# Patient Record
Sex: FEMALE | Race: WHITE | NOT HISPANIC OR LATINO | URBAN - METROPOLITAN AREA
[De-identification: names, ages, dates, MRNs, and addresses within clinical notes are randomized per-mention and may not be internally consistent; named-entity substitution may affect disease eponyms.]

---

## 2017-10-15 ENCOUNTER — EMERGENCY (EMERGENCY)
Facility: HOSPITAL | Age: 67
LOS: 1 days | Discharge: HOME | End: 2017-10-15
Attending: EMERGENCY MEDICINE | Admitting: EMERGENCY MEDICINE
Payer: SELF-PAY

## 2017-10-15 VITALS
DIASTOLIC BLOOD PRESSURE: 100 MMHG | SYSTOLIC BLOOD PRESSURE: 160 MMHG | HEART RATE: 88 BPM | TEMPERATURE: 98 F | RESPIRATION RATE: 18 BRPM

## 2017-10-15 LAB
ALBUMIN SERPL ELPH-MCNC: 4.4 G/DL — SIGNIFICANT CHANGE UP (ref 3.3–5)
ALP SERPL-CCNC: 50 U/L — SIGNIFICANT CHANGE UP (ref 40–120)
ALT FLD-CCNC: 13 U/L — SIGNIFICANT CHANGE UP (ref 4–33)
AST SERPL-CCNC: 11 U/L — SIGNIFICANT CHANGE UP (ref 4–32)
BASOPHILS # BLD AUTO: 0.03 K/UL — SIGNIFICANT CHANGE UP (ref 0–0.2)
BASOPHILS NFR BLD AUTO: 0.3 % — SIGNIFICANT CHANGE UP (ref 0–2)
BILIRUB SERPL-MCNC: 0.2 MG/DL — SIGNIFICANT CHANGE UP (ref 0.2–1.2)
BUN SERPL-MCNC: 19 MG/DL — SIGNIFICANT CHANGE UP (ref 7–23)
CALCIUM SERPL-MCNC: 9.9 MG/DL — SIGNIFICANT CHANGE UP (ref 8.4–10.5)
CHLORIDE SERPL-SCNC: 103 MMOL/L — SIGNIFICANT CHANGE UP (ref 98–107)
CO2 SERPL-SCNC: 25 MMOL/L — SIGNIFICANT CHANGE UP (ref 22–31)
CREAT SERPL-MCNC: 0.72 MG/DL — SIGNIFICANT CHANGE UP (ref 0.5–1.3)
CRP SERPL-MCNC: 0.9 MG/L — SIGNIFICANT CHANGE UP
EOSINOPHIL # BLD AUTO: 0.13 K/UL — SIGNIFICANT CHANGE UP (ref 0–0.5)
EOSINOPHIL NFR BLD AUTO: 1.3 % — SIGNIFICANT CHANGE UP (ref 0–6)
ERYTHROCYTE [SEDIMENTATION RATE] IN BLOOD: 16 MM/HR — SIGNIFICANT CHANGE UP (ref 4–25)
GLUCOSE SERPL-MCNC: 115 MG/DL — HIGH (ref 70–99)
HCT VFR BLD CALC: 43.7 % — SIGNIFICANT CHANGE UP (ref 34.5–45)
HGB BLD-MCNC: 13.7 G/DL — SIGNIFICANT CHANGE UP (ref 11.5–15.5)
IMM GRANULOCYTES # BLD AUTO: 0.07 # — SIGNIFICANT CHANGE UP
IMM GRANULOCYTES NFR BLD AUTO: 0.7 % — SIGNIFICANT CHANGE UP (ref 0–1.5)
LYMPHOCYTES # BLD AUTO: 3.16 K/UL — SIGNIFICANT CHANGE UP (ref 1–3.3)
LYMPHOCYTES # BLD AUTO: 32.3 % — SIGNIFICANT CHANGE UP (ref 13–44)
MCHC RBC-ENTMCNC: 27.3 PG — SIGNIFICANT CHANGE UP (ref 27–34)
MCHC RBC-ENTMCNC: 31.4 % — LOW (ref 32–36)
MCV RBC AUTO: 87.1 FL — SIGNIFICANT CHANGE UP (ref 80–100)
MONOCYTES # BLD AUTO: 0.83 K/UL — SIGNIFICANT CHANGE UP (ref 0–0.9)
MONOCYTES NFR BLD AUTO: 8.5 % — SIGNIFICANT CHANGE UP (ref 2–14)
NEUTROPHILS # BLD AUTO: 5.56 K/UL — SIGNIFICANT CHANGE UP (ref 1.8–7.4)
NEUTROPHILS NFR BLD AUTO: 56.9 % — SIGNIFICANT CHANGE UP (ref 43–77)
NRBC # FLD: 0 — SIGNIFICANT CHANGE UP
PLATELET # BLD AUTO: 247 K/UL — SIGNIFICANT CHANGE UP (ref 150–400)
PMV BLD: 12.7 FL — SIGNIFICANT CHANGE UP (ref 7–13)
POTASSIUM SERPL-MCNC: 4 MMOL/L — SIGNIFICANT CHANGE UP (ref 3.5–5.3)
POTASSIUM SERPL-SCNC: 4 MMOL/L — SIGNIFICANT CHANGE UP (ref 3.5–5.3)
PROT SERPL-MCNC: 7.7 G/DL — SIGNIFICANT CHANGE UP (ref 6–8.3)
RBC # BLD: 5.02 M/UL — SIGNIFICANT CHANGE UP (ref 3.8–5.2)
RBC # FLD: 14.4 % — SIGNIFICANT CHANGE UP (ref 10.3–14.5)
SODIUM SERPL-SCNC: 142 MMOL/L — SIGNIFICANT CHANGE UP (ref 135–145)
WBC # BLD: 9.78 K/UL — SIGNIFICANT CHANGE UP (ref 3.8–10.5)
WBC # FLD AUTO: 9.78 K/UL — SIGNIFICANT CHANGE UP (ref 3.8–10.5)

## 2017-10-15 PROCEDURE — 99218: CPT

## 2017-10-15 RX ORDER — SIMVASTATIN 20 MG/1
20 TABLET, FILM COATED ORAL AT BEDTIME
Qty: 0 | Refills: 0 | Status: DISCONTINUED | OUTPATIENT
Start: 2017-10-15 | End: 2017-10-19

## 2017-10-15 RX ORDER — INSULIN LISPRO 100/ML
VIAL (ML) SUBCUTANEOUS
Qty: 0 | Refills: 0 | Status: DISCONTINUED | OUTPATIENT
Start: 2017-10-15 | End: 2017-10-19

## 2017-10-15 RX ORDER — BRIMONIDINE TARTRATE 2 MG/MG
1 SOLUTION/ DROPS OPHTHALMIC ONCE
Qty: 0 | Refills: 0 | Status: COMPLETED | OUTPATIENT
Start: 2017-10-15 | End: 2017-10-15

## 2017-10-15 RX ORDER — DEXTROSE 50 % IN WATER 50 %
25 SYRINGE (ML) INTRAVENOUS ONCE
Qty: 0 | Refills: 0 | Status: DISCONTINUED | OUTPATIENT
Start: 2017-10-15 | End: 2017-10-19

## 2017-10-15 RX ORDER — VALSARTAN 80 MG/1
80 TABLET ORAL
Qty: 0 | Refills: 0 | Status: DISCONTINUED | OUTPATIENT
Start: 2017-10-15 | End: 2017-10-19

## 2017-10-15 RX ORDER — AMLODIPINE BESYLATE 2.5 MG/1
5 TABLET ORAL
Qty: 0 | Refills: 0 | Status: DISCONTINUED | OUTPATIENT
Start: 2017-10-15 | End: 2017-10-19

## 2017-10-15 RX ORDER — CLOPIDOGREL BISULFATE 75 MG/1
75 TABLET, FILM COATED ORAL DAILY
Qty: 0 | Refills: 0 | Status: DISCONTINUED | OUTPATIENT
Start: 2017-10-15 | End: 2017-10-19

## 2017-10-15 RX ORDER — DEXTROSE 50 % IN WATER 50 %
1 SYRINGE (ML) INTRAVENOUS ONCE
Qty: 0 | Refills: 0 | Status: DISCONTINUED | OUTPATIENT
Start: 2017-10-15 | End: 2017-10-19

## 2017-10-15 RX ORDER — SODIUM CHLORIDE 9 MG/ML
1000 INJECTION, SOLUTION INTRAVENOUS
Qty: 0 | Refills: 0 | Status: DISCONTINUED | OUTPATIENT
Start: 2017-10-15 | End: 2017-10-19

## 2017-10-15 RX ORDER — METFORMIN HYDROCHLORIDE 850 MG/1
850 TABLET ORAL
Qty: 0 | Refills: 0 | Status: DISCONTINUED | OUTPATIENT
Start: 2017-10-15 | End: 2017-10-19

## 2017-10-15 RX ORDER — BRIMONIDINE TARTRATE 2 MG/MG
1 SOLUTION/ DROPS OPHTHALMIC THREE TIMES A DAY
Qty: 0 | Refills: 0 | Status: DISCONTINUED | OUTPATIENT
Start: 2017-10-15 | End: 2017-10-19

## 2017-10-15 RX ORDER — INSULIN GLARGINE 100 [IU]/ML
44 INJECTION, SOLUTION SUBCUTANEOUS AT BEDTIME
Qty: 0 | Refills: 0 | Status: DISCONTINUED | OUTPATIENT
Start: 2017-10-15 | End: 2017-10-19

## 2017-10-15 RX ORDER — GLUCAGON INJECTION, SOLUTION 0.5 MG/.1ML
1 INJECTION, SOLUTION SUBCUTANEOUS ONCE
Qty: 0 | Refills: 0 | Status: DISCONTINUED | OUTPATIENT
Start: 2017-10-15 | End: 2017-10-19

## 2017-10-15 RX ORDER — DEXTROSE 50 % IN WATER 50 %
12.5 SYRINGE (ML) INTRAVENOUS ONCE
Qty: 0 | Refills: 0 | Status: DISCONTINUED | OUTPATIENT
Start: 2017-10-15 | End: 2017-10-19

## 2017-10-15 RX ADMIN — INSULIN GLARGINE 44 UNIT(S): 100 INJECTION, SOLUTION SUBCUTANEOUS at 22:11

## 2017-10-15 RX ADMIN — BRIMONIDINE TARTRATE 1 DROP(S): 2 SOLUTION/ DROPS OPHTHALMIC at 18:45

## 2017-10-15 RX ADMIN — BRIMONIDINE TARTRATE 1 DROP(S): 2 SOLUTION/ DROPS OPHTHALMIC at 22:10

## 2017-10-15 RX ADMIN — METFORMIN HYDROCHLORIDE 850 MILLIGRAM(S): 850 TABLET ORAL at 19:43

## 2017-10-15 RX ADMIN — SIMVASTATIN 20 MILLIGRAM(S): 20 TABLET, FILM COATED ORAL at 22:11

## 2017-10-15 RX ADMIN — CLOPIDOGREL BISULFATE 75 MILLIGRAM(S): 75 TABLET, FILM COATED ORAL at 19:43

## 2017-10-15 NOTE — ED CDU PROVIDER NOTE - ATTENDING CONTRIBUTION TO CARE
ED Attending (Dr Larios): I have personally performed a face to face bedside history and physical examination of this patient.  I have discussed the case with the PA and  I have personally authored the following components: HPI, ROS, Physical Exam and MDM in addition to reviewing and revising the rest of the Provider Note. CRAO, pending Echo and Carotid duplex.  F/u Optho reccs.  Obtain early AM imaging so pt can be dc'd before noon to go to Retina clinic (ophtho note).

## 2017-10-15 NOTE — ED CDU PROVIDER NOTE - PROGRESS NOTE DETAILS
CDU BUDDY Dodson: Pt reassessed following signout, she is resting comfortably in no acute and she is aware of the plan for echo, carotid duplex and optho appointment in the morning. CDU BUDDY Dodson: paged opthalmology with labs results, platelet count 247, CRP 0.9, ESR 16 CDU BUDDY Dodson: Patient sleeping comfortably in bed, VSS, NAD. No complaints overnight BUDDY Murcia - pt signed out to me BUDDY Dodson. pt seen and examined Dr. Kimbrough and I.   left message with optho office to touch base with team. BUDDY Murcia - pt signed out to me BUDDY Dodson. pt seen and examined Dr. Kimbrough and I.   left message with optho office to touch base with team. pt amenable for dc and will go to optho clinic today prior to them closing. given eye drop bottle here due to insurance may not be able to cover. advised not to drive. CDU DC note Kaylan: Seen and examined, have discussed plan of care with PA.   I agree with note as stated above, having amended the EMR as needed to reflect the findings. [facility]. [facility].

## 2017-10-15 NOTE — CONSULT NOTE ADULT - SUBJECTIVE AND OBJECTIVE BOX
Ophthalmology Consultation Note    CC: blurry vision    HPI: Patient is a 66 yo F with PMH of HTN, DM, HLD presenting with a 2 day history of left sided blurry vision. Patient reports that she was heavily blowing her nose on friday and later in the day started to develop blurry vision in the left eye. It was a painless loss of vision, and she described everything as being cloudy in the left eye. The following day, patient reports her vision improved somewhat, but did not go back to 100 %. However, the vision subsequently worsened and remained this way until today. She denies any double vision, scalp tenderness, jaw claudication, or proximal muscle aches.    PMH: HTN, HLD, DM  Meds: metformin, amlodipine, statin    Allergies: NKA    Exam:    VA cc (near): OD 20/25-1 PH to 20/20 OS CF  P: OD R&R, no APD OS APD  T (tonopen): 14, 12  EOM: full  CVF: OD full, OS difficult to assess      SLE:   LLA: WNL OU  CS: clear OU  K: PIOTR OU  AC: Deep + Quiet OU  I: Flat OU  L: NS OU    DFE:   OD: c/d 0.3, healthy appearing optic nerve without disc swelling or disc hemorrhages, macula flat, vessels sharp, periphery within normal limits  OS: c/d 0.3, mild fundal pallor, healthy appearing optic nerve without disc swelling or disc hemorrhages, macula flat, vessels sharp, periphery within normal limits Ophthalmology Consultation Note    CC: blurry vision    HPI: Patient is a 66 yo F with PMH of HTN, DM, HLD presenting with a 2 day history of left sided blurry vision. Patient reports that she was heavily blowing her nose on friday and later in the day started to develop blurry vision in the left eye. It was a painless loss of vision, and she described everything as being cloudy in the left eye. The following day, patient reports her vision improved somewhat, but did not go back to 100 %. However, the vision subsequently worsened and remained this way until today. She denies any double vision, scalp tenderness, jaw claudication, or proximal muscle aches.    PMH: HTN, HLD, DM  Meds: metformin, amlodipine, statin    Allergies: NKA    Exam:    VA cc (near):   OD 20/25-1 PH to 20/20.   OS CF  P: OD R&R, no APD OS APD  T (tonopen): 14, 12  EOM: full  CVF: OD full, OS difficult to assess      SLE:   LLA: WNL OU  CS: clear OU  K: PIOTR OU  AC: Deep + Quiet OU  I: Flat OU  L: NS OU    DFE:   OD: c/d 0.3, healthy appearing optic nerve without disc swelling or disc hemorrhages, macula flat, vessels sharp, periphery within normal limits  OS: c/d 0.3, mild fundal pallor, no disc swelling/pallor/atrophy, no disc hemorrhages, macula flat, vessels sharp, periphery within normal limits

## 2017-10-15 NOTE — ED CDU PROVIDER NOTE - OBJECTIVE STATEMENT
67 yr old F c HTN, HLD, DM who p/w painless left sided vision loss.  Started 2.5 days ago, while blowing nose aggressively. States got better initially, then much worse when she laid down.  Was fluctuating but present all day yesterday, then worse today. This has never happened before. No HA or claudication.  No temporal pain or tenderness.  No fever/chills.  No N/V/D.  No HA. Right eye normal. No other trauma. Is visiting from [place] and does not have a PMD here. Due to fly back this Thursday.  In ED seen by Ophtho who assessed pt to have likely CRAO and recommended labs and US studies.  No retinal detachment or vitreous hemorrhage. Labs still not all returned.  US ordered.  Pt needs to see Retina tomorrow morning (see Ophtho note) and should have imaging done as early as possible for DC before noon.

## 2017-10-15 NOTE — ED PROVIDER NOTE - PROGRESS NOTE DETAILS
Seen by ophtho who recommended meds and Carotid duplex, Echo.  Pt to stay in CDU for these since she's visiting and would have difficulty obtaining f/u in [place]. Retina office tomorrow.  Must leave [facility] by 11:30 tomorrow to get to retina specialist.

## 2017-10-15 NOTE — ED ADULT TRIAGE NOTE - CHIEF COMPLAINT QUOTE
Pt states friday night after sneezing hard had left eye vision lost lasting 1/2 hour then another episode of vision lost Saturday night  and into sunday. pt reports cloud over middle of  left eye sees better from sides of eyes.    Pt denies any pain to eye or head. pt also denies any problems with her gait or any extremity  weakness.

## 2017-10-15 NOTE — CONSULT NOTE ADULT - ASSESSMENT
Assessment:   Patient is a 67 year old female presenting with vision loss in left eye for two days. Exam notable for fundal pallor OS. 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       Plan:  - start Brimonidine TID OS  - recommend sending workup to rule out GCA, please send for ESR, CRP, Platelets  - Recommend Carotid Dopplers, ECHO  - Patient will need to follow up with Retina service at [facility] tomorrow for further workup and fluorescein angiogram. Address [address] [place] [address], [place]. [phone]. Patient may also follow at [facility] located at [address] [place] [address] [address], [place], [place]. [phone].    Case seen and discussed with PGY 4 resident, Dr. Simin Pearce MD  PGY 2 Ophthalmology Assessment:   Patient is a 67 year old female presenting with vision loss in left eye for two days. Exam notable for fundal pallor OS. 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       Plan:  - start Brimonidine TID OS  - recommend sending workup to rule out GCA, please send for ESR, CRP, Platelets. Patient denies GCA symptoms.  - Recommend Carotid Dopplers, ECHO  - Patient will need to follow up with Retina service at [facility] tomorrow for further workup and fluorescein angiogram. Address [address] [facility] [address], [place]. [phone]. Patient may also follow at [facility] located at [address] [place] [address] [address], [place], [place]. [phone].  -page ophthalmology with lab results    Case seen and discussed with PGY 4 resident, Dr. Simin Pearce MD  PGY 2 Ophthalmology     Patient seen and examined with Dr Pearce  Patient is a 67 F HTN, HLD, DM, with acute painless vision loss of her left eye. Retina is flat. There is mild pallor of the fundus in the left eye. No obvious hollenhorst plaque on exam. Given patients age, risk factors significant vision loss, and eye exam significant for pallor of her fundus os, no hemorrhages, no disc swelling/atrophy/pallor, concern for ischemic event. Recommend checking Echo and carotid dopplers. Although denies GCA symptoms, given age, please check ESR CRP Platelets. Patient will need to follow up with Retina specialist tomorrow morning at address and number listed above. Please page ophthalmology with results of lab work.  Bill Duval MD, PGY4 Assessment:   Patient is a 67 year old female presenting with vision loss in left eye for two days. Exam notable for fundal pallor OS. 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       Plan:  - start Brimonidine TID OS  - recommend sending workup to rule out GCA, please send for ESR, CRP, Platelets. Patient denies GCA symptoms.  - Recommend Carotid Dopplers, ECHO  - Patient needs to follow up with retina doctor tomorrow morning at 9 am. Patient will be seen at [address] [place] [address]. [phone]. Patient needs Fluorescein angiogram, and will be walked over to Retina clinic.   -page ophthalmology with lab results    Case seen and discussed with PGY 4 resident, Dr. Simin Pearce MD  PGY 2 Ophthalmology     Patient seen and examined with Dr Pearce  Patient is a 67 F HTN, HLD, DM, with acute painless vision loss of her left eye. Retina is flat. There is mild pallor of the fundus in the left eye. No obvious hollenhorst plaque on exam. Given patients age, risk factors significant vision loss, and eye exam significant for pallor of her fundus os, no hemorrhages, no disc swelling/atrophy/pallor, concern for ischemic event. Recommend checking Echo and carotid dopplers. Although denies GCA symptoms, given age, please check ESR CRP Platelets. Patient will need to follow up with Retina specialist tomorrow morning at address and number listed above. Please page ophthalmology with results of lab work.  Bill Duval MD, PGY4

## 2017-10-15 NOTE — ED CDU PROVIDER NOTE - PLAN OF CARE
[facility] at [address] [facility]. [address]. Call [phone] to make an appointment today. Optho team suggesting possible Fluroscein Angiogram. Use Brimonidine ey drops three times a day. Follow up with cardiology and vascular surgery this week, take copy of results with you. See your primary care provider within 48 hours. Return to ER for any new or worsening symptoms, fever, chills, weakness, numbness, facial drooping, headache, or any other concerns.

## 2017-10-15 NOTE — ED CDU PROVIDER NOTE - MEDICAL DECISION MAKING DETAILS
CRAO, pending Echo and Carotid duplex.  F/u Optho reccs.  Obtain early AM imaging so pt can be dc'd before noon to go to Retina clinic (ophtho note).

## 2017-10-15 NOTE — ED ADULT NURSE NOTE - OBJECTIVE STATEMENT
received pt to intake chairs for evaluation of vision loss to left eye, described as cloudy vision since friday, unresolved. pt reports she thought the symptoms would go away on their own, but haven't. pt denies any additional symptoms. pt presents awake a&ox4, denies dizziness or ha. skin warm, dry, appropriate for race. respirations even unlabored. denies cp or sob. denies n/v/d denies fevers or chills. ivl placed. bloods drawn and sent. family at chairside. denies pain or distress at this time. safety maintained. received pt to intake chairs for evaluation of vision loss to left eye, described as cloudy vision since friday, unresolved. pt reports she thought the symptoms would go away on their own, but haven't. pt denies any additional symptoms. denies visual disturbances to right eye. denies injury or trauma to eye. pt presents awake a&ox4, denies dizziness or ha. skin warm, dry, appropriate for race. respirations even unlabored. denies cp or sob. denies n/v/d denies fevers or chills. ivl placed. bloods drawn and sent. family at chairside. denies pain or distress at this time. safety maintained.

## 2017-10-15 NOTE — ED CDU PROVIDER NOTE - PHYSICAL EXAMINATION
EOMI PERRLA  Eyes appear superficially normal, no injection.   Left eye - loss of central field of vision only. Peripheral intact.    Right eye - corrected, normal visual acuity and field of visino.

## 2017-10-15 NOTE — ED PROVIDER NOTE - MEDICAL DECISION MAKING DETAILS
Loss of central field, left eye, otherwise intact.  Consider vitreous hemorrhage vs detachment.  Obtain ophtho eval

## 2017-10-15 NOTE — ED PROVIDER NOTE - PHYSICAL EXAMINATION
EOMI PERRLA  Eyes appear superficially normal, no injection.   Left eye - loss of central field of vision only. Perpheral intact.    Right eye - corrected, normal visual acuity and field of visino.

## 2017-10-15 NOTE — ED PROVIDER NOTE - OBJECTIVE STATEMENT
67 yr old F c HTN, HLD, DM who p/w painless left sided vision loss.  Started 2.5 days ago, while blowing nose aggressively. States got better initially, then much worse when she laid down.  Was fluctuating but present all day yesterday, then worse today. This has never happened before.  No fever/chills.  No N/V/D.  No HA. Right eye normal. No other trauma.

## 2017-10-16 ENCOUNTER — APPOINTMENT (OUTPATIENT)
Dept: OPHTHALMOLOGY | Facility: CLINIC | Age: 67
End: 2017-10-16

## 2017-10-16 VITALS
TEMPERATURE: 98 F | OXYGEN SATURATION: 97 % | SYSTOLIC BLOOD PRESSURE: 151 MMHG | RESPIRATION RATE: 14 BRPM | DIASTOLIC BLOOD PRESSURE: 61 MMHG | HEART RATE: 73 BPM

## 2017-10-16 PROBLEM — Z00.00 ENCOUNTER FOR PREVENTIVE HEALTH EXAMINATION: Status: ACTIVE | Noted: 2017-10-16

## 2017-10-16 PROCEDURE — 93880 EXTRACRANIAL BILAT STUDY: CPT | Mod: 26

## 2017-10-16 PROCEDURE — 99217: CPT

## 2017-10-16 PROCEDURE — 93306 TTE W/DOPPLER COMPLETE: CPT | Mod: 26

## 2017-10-16 RX ORDER — BRIMONIDINE TARTRATE 2 MG/MG
1 SOLUTION/ DROPS OPHTHALMIC
Qty: 1 | Refills: 0 | OUTPATIENT
Start: 2017-10-16 | End: 2017-11-15

## 2017-10-16 RX ADMIN — AMLODIPINE BESYLATE 5 MILLIGRAM(S): 2.5 TABLET ORAL at 05:48

## 2017-10-16 RX ADMIN — METFORMIN HYDROCHLORIDE 850 MILLIGRAM(S): 850 TABLET ORAL at 09:34

## 2017-10-16 RX ADMIN — BRIMONIDINE TARTRATE 1 DROP(S): 2 SOLUTION/ DROPS OPHTHALMIC at 05:48

## 2017-10-16 RX ADMIN — CLOPIDOGREL BISULFATE 75 MILLIGRAM(S): 75 TABLET, FILM COATED ORAL at 09:34

## 2017-10-16 RX ADMIN — VALSARTAN 80 MILLIGRAM(S): 80 TABLET ORAL at 07:17

## 2017-10-16 RX ADMIN — Medication: at 09:37

## 2019-03-26 NOTE — ED CDU PROVIDER NOTE - CPE EDP SKIN NORM
Patient last seen on 12/18/18 continue medciation, and to return in 5 months no appointment pending at this time.  Last refill done on 07/17/18 # 90 x 1 refill.  Rx faxed to pharmacy.   
normal...

## 2023-08-28 NOTE — ED ADULT TRIAGE NOTE - STATUS:
Applied Ivermectin Counseling:  Patient instructed to take medication on an empty stomach with a full glass of water.  Patient informed of potential adverse effects including but not limited to nausea, diarrhea, dizziness, itching, and swelling of the extremities or lymph nodes.  The patient verbalized understanding of the proper use and possible adverse effects of ivermectin.  All of the patient's questions and concerns were addressed.